# Patient Record
Sex: FEMALE | NOT HISPANIC OR LATINO
[De-identification: names, ages, dates, MRNs, and addresses within clinical notes are randomized per-mention and may not be internally consistent; named-entity substitution may affect disease eponyms.]

---

## 2023-08-17 PROBLEM — Z00.00 ENCOUNTER FOR PREVENTIVE HEALTH EXAMINATION: Status: ACTIVE | Noted: 2023-08-17

## 2024-01-02 ENCOUNTER — APPOINTMENT (OUTPATIENT)
Dept: DERMATOLOGY | Facility: CLINIC | Age: 24
End: 2024-01-02
Payer: MEDICAID

## 2024-01-02 PROCEDURE — 99204 OFFICE O/P NEW MOD 45 MIN: CPT

## 2024-04-02 ENCOUNTER — APPOINTMENT (OUTPATIENT)
Dept: DERMATOLOGY | Facility: CLINIC | Age: 24
End: 2024-04-02
Payer: MEDICAID

## 2024-04-02 DIAGNOSIS — L73.0 ACNE KELOID: ICD-10-CM

## 2024-04-02 PROCEDURE — 99214 OFFICE O/P EST MOD 30 MIN: CPT

## 2024-04-02 RX ORDER — CLASCOTERONE 1 G/100G
1 CREAM TOPICAL
Qty: 1 | Refills: 2 | Status: ACTIVE | COMMUNITY
Start: 2024-04-02

## 2024-04-02 RX ORDER — SPIRONOLACTONE 50 MG/1
50 TABLET ORAL
Qty: 90 | Refills: 1 | Status: ACTIVE | COMMUNITY
Start: 2024-01-02 | End: 1900-01-01

## 2024-04-02 RX ORDER — SPIRONOLACTONE 100 MG/1
100 TABLET ORAL
Qty: 90 | Refills: 1 | Status: ACTIVE | COMMUNITY
Start: 2024-01-02 | End: 1900-01-01

## 2024-04-02 NOTE — ASSESSMENT
[FreeTextEntry1] : #Acne (inflammatory/hormonal); Mild/Moderate, chronic, improving with spironolactone - Triggers: menses (regular, not on OCP) -Reviewed treatment options, including r/b/side effects of topical agents, oral spironolactone, and isotretinoin.  After this discussion the plan is for: - qAM: Medicated facewash (OTC SA wash). Abx field therapy (clindamycin 1% external lotion). - qPM: Medicated facewash (OTC SA wash). Continue tretinoin 0.025% cream nightly. Prev Discussed proper use - c/w Spironolactone to 150mg qHs.   Patient with no history of kidney or heart problems. Educated patient about side effects including but not limited to menstrual irregularities, breast tenderness, headaches, GI upset, hypotension. Patient denies palpitations and attributes chest pain to possible hyperkalemia. Discussed that while spironolactone has the potential to increase potassium levels, we do not routinely check potassium levels in patients because studies have not found clinically significant increases in the rate of hyperkalemia. Discussed potential increased risk of breast cancer. Educated patient about potential effects on a developing fetus and need for effective contraception. If family planning, discussed need to stop drug 3 months prior to becoming pregnant. - Education and anticipatory guidance (e.g., avoid picking at lesions). Discussed gentle skin care (oil-free and noncomedogenic products) and avoiding abrasive washing practices.  -Patient provided with written handout outlining treatment plan - START winlevi 1-2x/day given persistent flares, if not covered will increase tret 0.05%   #Acne scarring from above - Discussed natural history and slow time course for resolution   RTC 3-4 months if still flaring will consider accutane

## 2024-04-02 NOTE — HISTORY OF PRESENT ILLNESS
[FreeTextEntry1] : fu: acne [de-identified] : chayito dominguez is a 23 year old female who presents for acne (face, chest and back) x years. Reports she sees another dermatologist in [place], here for second opinion regarding isotretinoin. In 2022, used doxy x 6 months, then reinaldo x 6 months, but did not get better. Started in Aug 2023 spironolactone 100mg qhs, tolerating well except occasional "heart pain", improved since starting spironolactone. Jan 2024 dose increased to 150mg tolerating well, does note some menstrual irregularities but they are tolerable.  Also using SA wash, clindamycin qAM and tretinoin 0.025% qHS. Notes tolerating tretinoin, but overall has dry skin so worried about increasing dose.

## 2024-04-02 NOTE — PHYSICAL EXAM
[FreeTextEntry3] : AAOx3, NAD, well-appearing / pleasant Focused examination within normal limits with the exception of:  - scars on forehead and cheeks - scattered inflammatory papules and pustules on cheeks and jawlines

## 2024-05-20 RX ORDER — MINOCYCLINE HYDROCHLORIDE 100 MG/1
100 CAPSULE ORAL DAILY
Qty: 30 | Refills: 2 | Status: ACTIVE | COMMUNITY
Start: 2024-05-20 | End: 1900-01-01

## 2024-07-01 ENCOUNTER — NON-APPOINTMENT (OUTPATIENT)
Age: 24
End: 2024-07-01

## 2024-07-02 ENCOUNTER — APPOINTMENT (OUTPATIENT)
Dept: DERMATOLOGY | Facility: CLINIC | Age: 24
End: 2024-07-02
Payer: COMMERCIAL

## 2024-07-02 VITALS — BODY MASS INDEX: 20.11 KG/M2 | WEIGHT: 102.44 LBS | HEIGHT: 60 IN

## 2024-07-02 DIAGNOSIS — L70.0 ACNE VULGARIS: ICD-10-CM

## 2024-07-02 DIAGNOSIS — Z79.899 OTHER LONG TERM (CURRENT) DRUG THERAPY: ICD-10-CM

## 2024-07-02 LAB
ALBUMIN SERPL ELPH-MCNC: 4.9 G/DL
ALP BLD-CCNC: 68 U/L
ALT SERPL-CCNC: 14 U/L
ANION GAP SERPL CALC-SCNC: 16 MMOL/L
AST SERPL-CCNC: 17 U/L
BASOPHILS # BLD AUTO: 0.05 K/UL
BASOPHILS NFR BLD AUTO: 0.6 %
BILIRUB SERPL-MCNC: 0.9 MG/DL
BUN SERPL-MCNC: 12 MG/DL
CALCIUM SERPL-MCNC: 9.8 MG/DL
CHLORIDE SERPL-SCNC: 104 MMOL/L
CO2 SERPL-SCNC: 19 MMOL/L
CREAT SERPL-MCNC: 0.61 MG/DL
EGFR: 126 ML/MIN/1.73M2
EOSINOPHIL # BLD AUTO: 0.07 K/UL
EOSINOPHIL NFR BLD AUTO: 0.8 %
GLUCOSE SERPL-MCNC: 82 MG/DL
HCG SERPL-MCNC: <1 MIU/ML
HCT VFR BLD CALC: 46 %
HGB BLD-MCNC: 14.1 G/DL
IMM GRANULOCYTES NFR BLD AUTO: 0.2 %
LYMPHOCYTES # BLD AUTO: 2.19 K/UL
LYMPHOCYTES NFR BLD AUTO: 24.7 %
MAN DIFF?: NORMAL
MCHC RBC-ENTMCNC: 27.7 PG
MCHC RBC-ENTMCNC: 30.7 GM/DL
MCV RBC AUTO: 90.4 FL
MONOCYTES # BLD AUTO: 0.53 K/UL
MONOCYTES NFR BLD AUTO: 6 %
NEUTROPHILS # BLD AUTO: 6.01 K/UL
NEUTROPHILS NFR BLD AUTO: 67.7 %
PLATELET # BLD AUTO: 269 K/UL
POTASSIUM SERPL-SCNC: 4.4 MMOL/L
PROT SERPL-MCNC: 7.2 G/DL
RBC # BLD: 5.09 M/UL
RBC # FLD: 13 %
SODIUM SERPL-SCNC: 139 MMOL/L
TRIGL SERPL-MCNC: 54 MG/DL
WBC # FLD AUTO: 8.87 K/UL

## 2024-07-02 PROCEDURE — G2211 COMPLEX E/M VISIT ADD ON: CPT | Mod: NC

## 2024-07-02 PROCEDURE — 99215 OFFICE O/P EST HI 40 MIN: CPT

## 2024-07-02 RX ORDER — TRETINOIN 0.25 MG/G
0.03 CREAM TOPICAL
Qty: 1 | Refills: 3 | Status: ACTIVE | COMMUNITY
Start: 2024-07-02 | End: 1900-01-01

## 2024-07-02 RX ORDER — CLINDAMYCIN PHOSPHATE 10 MG/ML
1 LOTION TOPICAL TWICE DAILY
Qty: 1 | Refills: 6 | Status: ACTIVE | COMMUNITY
Start: 2024-07-02 | End: 1900-01-01

## 2024-08-26 RX ORDER — ISOTRETINOIN 20 MG/1
20 CAPSULE, LIQUID FILLED ORAL DAILY
Qty: 30 | Refills: 0 | Status: ACTIVE | COMMUNITY
Start: 2024-08-22 | End: 1900-01-01

## 2024-09-03 ENCOUNTER — TRANSCRIPTION ENCOUNTER (OUTPATIENT)
Age: 24
End: 2024-09-03

## 2024-09-03 ENCOUNTER — APPOINTMENT (OUTPATIENT)
Dept: DERMATOLOGY | Facility: CLINIC | Age: 24
End: 2024-09-03
Payer: COMMERCIAL

## 2024-09-03 VITALS — WEIGHT: 105 LBS | HEIGHT: 60 IN | BODY MASS INDEX: 20.62 KG/M2

## 2024-09-03 DIAGNOSIS — L70.0 ACNE VULGARIS: ICD-10-CM

## 2024-09-03 DIAGNOSIS — Z79.899 OTHER LONG TERM (CURRENT) DRUG THERAPY: ICD-10-CM

## 2024-09-03 PROCEDURE — 99214 OFFICE O/P EST MOD 30 MIN: CPT

## 2024-09-03 NOTE — PHYSICAL EXAM
[Alert] : alert [Oriented x 3] : ~L oriented x 3 [Well Nourished] : well nourished [Conjunctiva Non-injected] : conjunctiva non-injected [No Visual Lymphadenopathy] : no visual  lymphadenopathy [No Clubbing] : no clubbing [No Edema] : no edema [No Bromhidrosis] : no bromhidrosis [No Chromhidrosis] : no chromhidrosis [FreeTextEntry3] : Erythematous papules on the cheeks and chin with background scarring

## 2024-09-03 NOTE — ASSESSMENT
[FreeTextEntry1] : #Acne (inflammatory/hormonal); Mild/Moderate, chronic, flaring - Triggers: menses (regular, not on OCP) -Reviewed treatment options, including r/b/side effects of topical agents, oral spironolactone, and isotretinoin.  After this discussion the plan is for: -Start isotretinoin 20mg QD -Electronic consent signed previously - extensive counseling and discussion regarding use of accutane and side fx including: birth defects, depression, headache, blurred vision, dizziness, N/V, seizures, stroke, abdominal pain, trouble swallowing, heartburn, diarrhea, rectal bleeding, yellowing of skin or eyes, dark urine, bone and muscle aches, hearing problems, visual problems, lipid disturbances, allergic reactions, blood sugar problems, red or white blood cell problems, dry lips, eyes, skin. - Not to give blood, drive at night if affects vision, no cosmetic procedures or surgeries for 6 months after stopping since can have bad scarring, photosensitivity  Weight - 103lbs = 47kg Goal Dose - 7050mg Contraception: Abstinence  #High Risk Medication Use -  Baseline labs in July unremarkable  RTC 1 month

## 2024-09-03 NOTE — HISTORY OF PRESENT ILLNESS
[FreeTextEntry1] : fu: acne [de-identified] : 24 year old female who presents for acne (face, chest and back) x years. Was due to start isotretinoin about 4 weeks ago but wanted to wean off the spironolactone first which she did last week. Only using topicals currently

## 2024-09-13 ENCOUNTER — NON-APPOINTMENT (OUTPATIENT)
Age: 24
End: 2024-09-13

## 2024-10-08 ENCOUNTER — APPOINTMENT (OUTPATIENT)
Dept: DERMATOLOGY | Facility: CLINIC | Age: 24
End: 2024-10-08
Payer: COMMERCIAL

## 2024-10-08 VITALS — BODY MASS INDEX: 20.62 KG/M2 | WEIGHT: 105 LBS | HEIGHT: 60 IN

## 2024-10-08 DIAGNOSIS — L70.0 ACNE VULGARIS: ICD-10-CM

## 2024-10-08 DIAGNOSIS — Z79.899 OTHER LONG TERM (CURRENT) DRUG THERAPY: ICD-10-CM

## 2024-10-08 PROCEDURE — 99214 OFFICE O/P EST MOD 30 MIN: CPT

## 2024-10-09 LAB
ALBUMIN SERPL ELPH-MCNC: 4.6 G/DL
ALP BLD-CCNC: 73 U/L
ALT SERPL-CCNC: 14 U/L
ANION GAP SERPL CALC-SCNC: 15 MMOL/L
AST SERPL-CCNC: 22 U/L
BILIRUB SERPL-MCNC: 0.8 MG/DL
BUN SERPL-MCNC: 9 MG/DL
CALCIUM SERPL-MCNC: 9.8 MG/DL
CHLORIDE SERPL-SCNC: 104 MMOL/L
CHOLEST SERPL-MCNC: 163 MG/DL
CO2 SERPL-SCNC: 21 MMOL/L
CREAT SERPL-MCNC: 0.54 MG/DL
EGFR: 130 ML/MIN/1.73M2
GLUCOSE SERPL-MCNC: 82 MG/DL
HCG SERPL-MCNC: <1 MIU/ML
HCT VFR BLD CALC: 42.5 %
HDLC SERPL-MCNC: 50 MG/DL
HGB BLD-MCNC: 13.9 G/DL
LDLC SERPL CALC-MCNC: 103 MG/DL
MCHC RBC-ENTMCNC: 28.8 PG
MCHC RBC-ENTMCNC: 32.7 GM/DL
MCV RBC AUTO: 88 FL
NONHDLC SERPL-MCNC: 113 MG/DL
PLATELET # BLD AUTO: 238 K/UL
POTASSIUM SERPL-SCNC: 4.4 MMOL/L
PROT SERPL-MCNC: 7.1 G/DL
RBC # BLD: 4.83 M/UL
RBC # FLD: 12.1 %
SODIUM SERPL-SCNC: 139 MMOL/L
TRIGL SERPL-MCNC: 54 MG/DL
WBC # FLD AUTO: 5.87 K/UL

## 2024-11-07 ENCOUNTER — APPOINTMENT (OUTPATIENT)
Dept: DERMATOLOGY | Facility: CLINIC | Age: 24
End: 2024-11-07
Payer: COMMERCIAL

## 2024-11-07 VITALS — WEIGHT: 130 LBS | HEIGHT: 60 IN | BODY MASS INDEX: 25.52 KG/M2

## 2024-11-07 DIAGNOSIS — Z79.899 OTHER LONG TERM (CURRENT) DRUG THERAPY: ICD-10-CM

## 2024-11-07 DIAGNOSIS — L70.0 ACNE VULGARIS: ICD-10-CM

## 2024-11-07 PROCEDURE — 99214 OFFICE O/P EST MOD 30 MIN: CPT

## 2024-11-08 LAB
ALBUMIN SERPL ELPH-MCNC: 4.6 G/DL
ALP BLD-CCNC: 89 U/L
ALT SERPL-CCNC: 40 U/L
AST SERPL-CCNC: 32 U/L
BILIRUB DIRECT SERPL-MCNC: 0.1 MG/DL
BILIRUB INDIRECT SERPL-MCNC: 0.5 MG/DL
BILIRUB SERPL-MCNC: 0.7 MG/DL
CHOLEST SERPL-MCNC: 202 MG/DL
HCG SERPL-MCNC: <1 MIU/ML
HDLC SERPL-MCNC: 57 MG/DL
LDLC SERPL CALC-MCNC: 129 MG/DL
NONHDLC SERPL-MCNC: 144 MG/DL
PROT SERPL-MCNC: 7.4 G/DL
TRIGL SERPL-MCNC: 83 MG/DL

## 2024-12-05 ENCOUNTER — APPOINTMENT (OUTPATIENT)
Dept: DERMATOLOGY | Facility: CLINIC | Age: 24
End: 2024-12-05
Payer: COMMERCIAL

## 2024-12-05 VITALS — HEIGHT: 61 IN | WEIGHT: 135 LBS | BODY MASS INDEX: 25.49 KG/M2

## 2024-12-05 DIAGNOSIS — Z79.899 OTHER LONG TERM (CURRENT) DRUG THERAPY: ICD-10-CM

## 2024-12-05 DIAGNOSIS — L70.0 ACNE VULGARIS: ICD-10-CM

## 2024-12-05 PROCEDURE — 99214 OFFICE O/P EST MOD 30 MIN: CPT

## 2024-12-06 LAB
ALBUMIN SERPL ELPH-MCNC: 4.6 G/DL
ALP BLD-CCNC: 79 U/L
ALT SERPL-CCNC: 17 U/L
AST SERPL-CCNC: 21 U/L
BASOPHILS # BLD AUTO: 0.03 K/UL
BASOPHILS NFR BLD AUTO: 0.5 %
BILIRUB DIRECT SERPL-MCNC: 0.1 MG/DL
BILIRUB INDIRECT SERPL-MCNC: 0.4 MG/DL
BILIRUB SERPL-MCNC: 0.5 MG/DL
CHOLEST SERPL-MCNC: 196 MG/DL
EOSINOPHIL # BLD AUTO: 0.08 K/UL
EOSINOPHIL NFR BLD AUTO: 1.3 %
HCG SERPL-MCNC: <1 MIU/ML
HCT VFR BLD CALC: 42.9 %
HDLC SERPL-MCNC: 50 MG/DL
HGB BLD-MCNC: 13.3 G/DL
IMM GRANULOCYTES NFR BLD AUTO: 0 %
LDLC SERPL CALC-MCNC: 130 MG/DL
LYMPHOCYTES # BLD AUTO: 2.25 K/UL
LYMPHOCYTES NFR BLD AUTO: 36.8 %
MAN DIFF?: NORMAL
MCHC RBC-ENTMCNC: 27.7 PG
MCHC RBC-ENTMCNC: 31 G/DL
MCV RBC AUTO: 89.2 FL
MONOCYTES # BLD AUTO: 0.38 K/UL
MONOCYTES NFR BLD AUTO: 6.2 %
NEUTROPHILS # BLD AUTO: 3.37 K/UL
NEUTROPHILS NFR BLD AUTO: 55.2 %
NONHDLC SERPL-MCNC: 146 MG/DL
PLATELET # BLD AUTO: 206 K/UL
PROT SERPL-MCNC: 7.2 G/DL
RBC # BLD: 4.81 M/UL
RBC # FLD: 12.5 %
TRIGL SERPL-MCNC: 87 MG/DL
WBC # FLD AUTO: 6.11 K/UL

## 2025-01-06 ENCOUNTER — APPOINTMENT (OUTPATIENT)
Dept: DERMATOLOGY | Facility: CLINIC | Age: 25
End: 2025-01-06

## 2025-01-28 ENCOUNTER — APPOINTMENT (OUTPATIENT)
Dept: DERMATOLOGY | Facility: CLINIC | Age: 25
End: 2025-01-28
Payer: COMMERCIAL

## 2025-01-28 DIAGNOSIS — L70.0 ACNE VULGARIS: ICD-10-CM

## 2025-01-28 DIAGNOSIS — Z79.899 OTHER LONG TERM (CURRENT) DRUG THERAPY: ICD-10-CM

## 2025-01-28 PROCEDURE — 99214 OFFICE O/P EST MOD 30 MIN: CPT

## 2025-01-29 LAB — HCG SERPL-MCNC: <1 MIU/ML

## 2025-03-04 ENCOUNTER — APPOINTMENT (OUTPATIENT)
Dept: DERMATOLOGY | Facility: CLINIC | Age: 25
End: 2025-03-04
Payer: COMMERCIAL

## 2025-03-04 ENCOUNTER — NON-APPOINTMENT (OUTPATIENT)
Age: 25
End: 2025-03-04

## 2025-03-04 DIAGNOSIS — L70.0 ACNE VULGARIS: ICD-10-CM

## 2025-03-04 DIAGNOSIS — Z79.899 OTHER LONG TERM (CURRENT) DRUG THERAPY: ICD-10-CM

## 2025-03-04 DIAGNOSIS — L90.5 SCAR CONDITIONS AND FIBROSIS OF SKIN: ICD-10-CM

## 2025-03-04 PROCEDURE — G2211 COMPLEX E/M VISIT ADD ON: CPT | Mod: NC

## 2025-03-04 PROCEDURE — 99214 OFFICE O/P EST MOD 30 MIN: CPT

## 2025-03-04 RX ORDER — TRETINOIN 0.25 MG/G
0.03 CREAM TOPICAL
Qty: 1 | Refills: 1 | Status: ACTIVE | COMMUNITY
Start: 2025-03-04 | End: 1900-01-01

## 2025-03-05 LAB — HCG SERPL-MCNC: <1 MIU/ML

## 2025-05-22 ENCOUNTER — APPOINTMENT (OUTPATIENT)
Dept: DERMATOLOGY | Facility: CLINIC | Age: 25
End: 2025-05-22